# Patient Record
Sex: FEMALE | Race: WHITE | NOT HISPANIC OR LATINO | URBAN - METROPOLITAN AREA
[De-identification: names, ages, dates, MRNs, and addresses within clinical notes are randomized per-mention and may not be internally consistent; named-entity substitution may affect disease eponyms.]

---

## 2020-07-17 ENCOUNTER — APPOINTMENT (OUTPATIENT)
Dept: URBAN - METROPOLITAN AREA SURGERY 9 | Age: 40
Setting detail: DERMATOLOGY
End: 2020-07-17

## 2020-07-17 DIAGNOSIS — L71.8 OTHER ROSACEA: ICD-10-CM

## 2020-07-17 PROBLEM — L30.9 DERMATITIS, UNSPECIFIED: Status: ACTIVE | Noted: 2020-07-17

## 2020-07-17 PROCEDURE — OTHER PRESCRIPTION: OTHER

## 2020-07-17 PROCEDURE — OTHER TREATMENT REGIMEN: OTHER

## 2020-07-17 PROCEDURE — 99202 OFFICE O/P NEW SF 15 MIN: CPT

## 2020-07-17 PROCEDURE — OTHER COUNSELING: OTHER

## 2020-07-17 RX ORDER — MINOCYCLINE HYDROCHLORIDE 100 MG/1
CAPSULE ORAL
Qty: 30 | Refills: 2 | Status: ERX

## 2020-07-17 RX ORDER — METRONIDAZOLE 7.5 MG/G
CREAM TOPICAL DAILY
Qty: 1 | Refills: 2 | Status: ERX

## 2020-07-17 ASSESSMENT — LOCATION SIMPLE DESCRIPTION DERM: LOCATION SIMPLE: LEFT CHEEK

## 2020-07-17 ASSESSMENT — LOCATION ZONE DERM: LOCATION ZONE: FACE

## 2020-07-17 ASSESSMENT — LOCATION DETAILED DESCRIPTION DERM: LOCATION DETAILED: LEFT INFERIOR CENTRAL MALAR CHEEK

## 2020-07-17 NOTE — HPI: RASH
Is The Patient Presenting As Previously Scheduled?: Yes
How Severe Is Your Rash?: moderate
Is This A New Presentation, Or A Follow-Up?: Rash
Additional History: Had patch testing done and was allergic to Cetaphil night moisturizer and target brand cleanser. \\nUsing baby wash as facial cleanser.

## 2020-07-17 NOTE — PROCEDURE: TREATMENT REGIMEN
Plan: Call if not improving and still flares with itchy bumps, consider permethrin cream then if it does not contain cetyl alcohol.
Otc Regimen: Vanicream products
Initiate Treatment: MCN 100mg daily\\nMetronidazole cream once to twice daily
Detail Level: Zone

## 2020-07-17 NOTE — PROCEDURE: COUNSELING
Patient Specific Counseling (Will Not Stick From Patient To Patient): Denies pregnancy, no plans. MCN risks reviewed. \\nMay have element of irritant or allergic contact dermatitis as well given known dog allergies and has pet dogs and cetyl alcohol present in 2 products she is known to be allergic to.
Detail Level: Zone

## 2020-10-16 ENCOUNTER — APPOINTMENT (OUTPATIENT)
Dept: URBAN - METROPOLITAN AREA SURGERY 9 | Age: 40
Setting detail: DERMATOLOGY
End: 2020-10-16

## 2020-10-16 DIAGNOSIS — L72.0 EPIDERMAL CYST: ICD-10-CM

## 2020-10-16 DIAGNOSIS — L71.8 OTHER ROSACEA: ICD-10-CM

## 2020-10-16 PROBLEM — L30.9 DERMATITIS, UNSPECIFIED: Status: ACTIVE | Noted: 2020-10-16

## 2020-10-16 PROCEDURE — OTHER OTHER: OTHER

## 2020-10-16 PROCEDURE — OTHER REASSURANCE: OTHER

## 2020-10-16 PROCEDURE — OTHER TREATMENT REGIMEN: OTHER

## 2020-10-16 PROCEDURE — OTHER COUNSELING: OTHER

## 2020-10-16 PROCEDURE — 99213 OFFICE O/P EST LOW 20 MIN: CPT

## 2020-10-16 ASSESSMENT — LOCATION ZONE DERM
LOCATION ZONE: EYELID
LOCATION ZONE: FACE

## 2020-10-16 ASSESSMENT — LOCATION DETAILED DESCRIPTION DERM
LOCATION DETAILED: LEFT INFERIOR CENTRAL MALAR CHEEK
LOCATION DETAILED: RIGHT LATERAL CANTHUS

## 2020-10-16 ASSESSMENT — LOCATION SIMPLE DESCRIPTION DERM
LOCATION SIMPLE: RIGHT EYELID
LOCATION SIMPLE: LEFT CHEEK

## 2020-10-16 NOTE — PROCEDURE: OTHER
Note Text (......Xxx Chief Complaint.): This diagnosis correlates with the
Other (Free Text): KP present on lateral cheeks (confluent red patches)
Detail Level: Simple

## 2020-10-16 NOTE — PROCEDURE: TREATMENT REGIMEN
Plan: If needs to get back on an oral medication in the winter the patient can call for Doxycycline or MCN in late spring/summer.
Continue Regimen: Metronidazole cream once to twice daily (can try to wean off metronidazole cream completely to see if she really needs it)
Otc Regimen: Continue Vanicream products
Discontinue Regimen: MCN 100mg daily
Detail Level: Zone

## 2021-10-22 ENCOUNTER — APPOINTMENT (OUTPATIENT)
Dept: URBAN - METROPOLITAN AREA SURGERY 9 | Age: 41
Setting detail: DERMATOLOGY
End: 2021-10-22

## 2021-10-22 DIAGNOSIS — L259 CONTACT DERMATITIS AND OTHER ECZEMA, UNSPECIFIED CAUSE: ICD-10-CM

## 2021-10-22 PROBLEM — L23.9 ALLERGIC CONTACT DERMATITIS, UNSPECIFIED CAUSE: Status: ACTIVE | Noted: 2021-10-22

## 2021-10-22 PROCEDURE — OTHER PRESCRIPTION MEDICATION MANAGEMENT: OTHER

## 2021-10-22 PROCEDURE — OTHER OTHER: OTHER

## 2021-10-22 PROCEDURE — 99213 OFFICE O/P EST LOW 20 MIN: CPT

## 2021-10-22 PROCEDURE — OTHER COUNSELING: OTHER

## 2021-10-22 PROCEDURE — OTHER PRESCRIPTION: OTHER

## 2021-10-22 RX ORDER — HYDROCORTISONE 25 MG/G
CREAM TOPICAL TWICE A DAY
Qty: 30 | Refills: 5 | Status: ERX | COMMUNITY
Start: 2021-10-22

## 2021-10-22 ASSESSMENT — LOCATION DETAILED DESCRIPTION DERM
LOCATION DETAILED: LEFT INFERIOR CENTRAL MALAR CHEEK
LOCATION DETAILED: LEFT SUPERIOR ANTERIOR NECK

## 2021-10-22 ASSESSMENT — LOCATION SIMPLE DESCRIPTION DERM
LOCATION SIMPLE: LEFT CHEEK
LOCATION SIMPLE: LEFT ANTERIOR NECK

## 2021-10-22 ASSESSMENT — LOCATION ZONE DERM
LOCATION ZONE: NECK
LOCATION ZONE: FACE

## 2021-10-22 NOTE — PROCEDURE: OTHER
Other (Free Text): If not better in future, consider biopsy vs repeat patch testing.
Detail Level: Simple
Render Risk Assessment In Note?: no
Note Text (......Xxx Chief Complaint.): This diagnosis correlates with the

## 2021-10-22 NOTE — PROCEDURE: PRESCRIPTION MEDICATION MANAGEMENT
Detail Level: Simple
Render In Strict Bullet Format?: No
Initiate Treatment: Hydrocortisone 2.5% cream twice daily as directed

## 2021-10-22 NOTE — PROCEDURE: COUNSELING
Patient Specific Counseling (Will Not Stick From Patient To Patient): Prior patch testing showed reaction (we do not have results). She is avoiding known chemical allergens but has a pet dog and tested as allergic to dogs. Reviewed this may be driving ongoing flares.
Detail Level: Zone

## 2021-10-29 ENCOUNTER — RX ONLY (RX ONLY)
Age: 41
End: 2021-10-29

## 2021-10-29 RX ORDER — HYDROCORTISONE 25 MG/G
OINTMENT TOPICAL
Qty: 28.35 | Refills: 1 | Status: ERX | COMMUNITY
Start: 2021-10-29

## 2022-11-07 ENCOUNTER — APPOINTMENT (OUTPATIENT)
Dept: URBAN - METROPOLITAN AREA SURGERY 9 | Age: 42
Setting detail: DERMATOLOGY
End: 2022-11-07

## 2022-11-07 DIAGNOSIS — L90.5 SCAR CONDITIONS AND FIBROSIS OF SKIN: ICD-10-CM

## 2022-11-07 PROCEDURE — OTHER COSMETIC CONSULTATION: FILLERS: OTHER

## 2022-11-07 PROCEDURE — OTHER OTHER: OTHER

## 2022-11-07 PROCEDURE — OTHER CONSULTATION: LASER SCAR REVISION: OTHER

## 2022-11-07 NOTE — PROCEDURE: OTHER
Other (Free Text): cost will be $250 per laser treatment
Detail Level: Detailed
Note Text (......Xxx Chief Complaint.): This diagnosis correlates with the
Render Risk Assessment In Note?: no
Other (Free Text): reviewed TCA-cross as option; multiple treatments would be needed. Cost of each treatment is $150
Other (Free Text): discussed option of filler to address depression of scar. Pt will consider but leaning towards CO2 laser as preferred option